# Patient Record
Sex: FEMALE | Race: WHITE | Employment: OTHER | URBAN - METROPOLITAN AREA
[De-identification: names, ages, dates, MRNs, and addresses within clinical notes are randomized per-mention and may not be internally consistent; named-entity substitution may affect disease eponyms.]

---

## 2017-06-10 ENCOUNTER — HOSPITAL ENCOUNTER (OUTPATIENT)
Dept: CT IMAGING | Age: 67
Discharge: HOME | End: 2017-06-10
Attending: FAMILY MEDICINE
Payer: COMMERCIAL

## 2017-06-10 DIAGNOSIS — K43.9 VENTRAL HERNIA WITHOUT OBSTRUCTION OR GANGRENE: ICD-10-CM

## 2017-06-10 LAB — CREAT BLD-MCNC: 0.8 MG/DL (ref 0.6–1)

## 2017-06-10 PROCEDURE — 74177 CT ABD & PELVIS W/CONTRAST: CPT

## 2017-06-10 PROCEDURE — 82565 ASSAY OF CREATININE: CPT

## 2017-06-10 PROCEDURE — 74011636320 HC RX REV CODE- 636/320: Performed by: FAMILY MEDICINE

## 2017-06-10 PROCEDURE — 74011000258 HC RX REV CODE- 258: Performed by: FAMILY MEDICINE

## 2017-06-10 RX ORDER — SODIUM CHLORIDE 0.9 % (FLUSH) 0.9 %
10 SYRINGE (ML) INJECTION
Status: COMPLETED | OUTPATIENT
Start: 2017-06-10 | End: 2017-06-10

## 2017-06-10 RX ADMIN — DIATRIZOATE MEGLUMINE AND DIATRIZOATE SODIUM 15 ML: 660; 100 LIQUID ORAL; RECTAL at 11:06

## 2017-06-10 RX ADMIN — IOPAMIDOL 100 ML: 755 INJECTION, SOLUTION INTRAVENOUS at 11:05

## 2017-06-10 RX ADMIN — SODIUM CHLORIDE 100 ML: 900 INJECTION, SOLUTION INTRAVENOUS at 11:05

## 2017-06-10 RX ADMIN — Medication 10 ML: at 11:05

## 2017-06-11 NOTE — PROGRESS NOTES
May advise that CT shows some atrophy of left abdominal muscle with some bulging, but no definite hernia. Also some scattered diverticulosis.

## 2017-07-24 ENCOUNTER — HOSPITAL ENCOUNTER (OUTPATIENT)
Dept: MAMMOGRAPHY | Age: 67
Discharge: HOME | End: 2017-07-24
Attending: FAMILY MEDICINE
Payer: COMMERCIAL

## 2017-07-24 DIAGNOSIS — Z12.31 VISIT FOR SCREENING MAMMOGRAM: ICD-10-CM

## 2017-07-24 PROCEDURE — 77067 SCR MAMMO BI INCL CAD: CPT

## 2017-10-25 PROBLEM — I49.3 PVC'S (PREMATURE VENTRICULAR CONTRACTIONS): Status: ACTIVE | Noted: 2017-10-25

## 2018-03-19 ENCOUNTER — HOSPITAL ENCOUNTER (OUTPATIENT)
Dept: MAMMOGRAPHY | Age: 68
Discharge: HOME | End: 2018-03-19
Attending: FAMILY MEDICINE
Payer: MEDICARE

## 2018-03-19 DIAGNOSIS — Z78.0 POST-MENOPAUSAL: ICD-10-CM

## 2018-03-19 PROCEDURE — 77080 DXA BONE DENSITY AXIAL: CPT

## 2018-03-19 NOTE — PROGRESS NOTES
Bone density results -   Has minimal osteopenia (mild bone loss) - improved in spine from last study in 2014 and stable in hips.   Disregard radiologist impression note, had increase, not decrease in bone density according to findings  Can repeat bone density in 3-5 years

## 2018-03-26 ENCOUNTER — HOSPITAL ENCOUNTER (OUTPATIENT)
Dept: LAB | Age: 68
Discharge: HOME | End: 2018-03-26

## 2018-03-26 PROCEDURE — 88305 TISSUE EXAM BY PATHOLOGIST: CPT | Performed by: INTERNAL MEDICINE

## 2018-07-26 ENCOUNTER — HOSPITAL ENCOUNTER (OUTPATIENT)
Dept: MAMMOGRAPHY | Age: 68
Discharge: HOME | End: 2018-07-26
Attending: FAMILY MEDICINE
Payer: MEDICARE

## 2018-07-26 DIAGNOSIS — Z12.31 VISIT FOR SCREENING MAMMOGRAM: ICD-10-CM

## 2018-07-26 PROCEDURE — 77067 SCR MAMMO BI INCL CAD: CPT

## 2018-10-25 ENCOUNTER — HOSPITAL ENCOUNTER (OUTPATIENT)
Dept: LAB | Age: 68
Discharge: HOME | End: 2018-10-25
Payer: MEDICARE

## 2018-10-25 DIAGNOSIS — R07.89 OTHER CHEST PAIN: ICD-10-CM

## 2018-10-25 LAB — TROPONIN I SERPL-MCNC: <0.02 NG/ML (ref 0.02–0.05)

## 2018-10-25 PROCEDURE — 36415 COLL VENOUS BLD VENIPUNCTURE: CPT

## 2018-10-25 PROCEDURE — 84484 ASSAY OF TROPONIN QUANT: CPT

## 2019-04-19 ENCOUNTER — HOSPITAL ENCOUNTER (OUTPATIENT)
Dept: LAB | Age: 69
Discharge: HOME | End: 2019-04-19
Payer: MEDICARE

## 2019-04-19 DIAGNOSIS — E78.00 HYPERCHOLESTEROLEMIA: ICD-10-CM

## 2019-04-19 LAB
CHOLEST SERPL-MCNC: 276 MG/DL
HDLC SERPL-MCNC: 71 MG/DL (ref 40–60)
HDLC SERPL: 3.9 {RATIO}
LDLC SERPL CALC-MCNC: 186.6 MG/DL
LIPID PROFILE,FLP: ABNORMAL
TRIGL SERPL-MCNC: 92 MG/DL (ref 35–150)
VLDLC SERPL CALC-MCNC: 18.4 MG/DL (ref 6–23)

## 2019-04-19 PROCEDURE — 36415 COLL VENOUS BLD VENIPUNCTURE: CPT

## 2019-04-19 PROCEDURE — 80061 LIPID PANEL: CPT

## 2019-05-16 ENCOUNTER — TELEPHONE (OUTPATIENT)
Dept: INTERNAL MEDICINE | Age: 69
End: 2019-05-16

## 2019-07-29 ENCOUNTER — HOSPITAL ENCOUNTER (OUTPATIENT)
Dept: MAMMOGRAPHY | Age: 69
Discharge: HOME | End: 2019-07-29
Attending: FAMILY MEDICINE
Payer: MEDICARE

## 2019-07-29 DIAGNOSIS — Z12.39 BREAST SCREENING, UNSPECIFIED: ICD-10-CM

## 2019-07-29 PROCEDURE — 77067 SCR MAMMO BI INCL CAD: CPT

## 2020-07-30 ENCOUNTER — HOSPITAL ENCOUNTER (OUTPATIENT)
Dept: MAMMOGRAPHY | Age: 70
Discharge: HOME | End: 2020-07-30
Attending: FAMILY MEDICINE
Payer: MEDICARE

## 2020-07-30 DIAGNOSIS — Z12.31 VISIT FOR SCREENING MAMMOGRAM: ICD-10-CM

## 2020-07-30 PROCEDURE — 77067 SCR MAMMO BI INCL CAD: CPT

## 2021-01-28 ENCOUNTER — TRANSCRIBE ORDER (OUTPATIENT)
Dept: SCHEDULING | Age: 71
End: 2021-01-28

## 2021-01-28 DIAGNOSIS — Z12.31 SCREENING MAMMOGRAM FOR HIGH-RISK PATIENT: Primary | ICD-10-CM

## 2021-04-14 PROBLEM — G44.029 CHRONIC CLUSTER HEADACHE, NOT INTRACTABLE: Status: ACTIVE | Noted: 2021-04-14

## 2021-07-14 PROBLEM — I65.23 ATHEROSCLEROSIS OF BOTH CAROTID ARTERIES: Status: ACTIVE | Noted: 2021-07-14

## 2021-07-24 ENCOUNTER — HOSPITAL ENCOUNTER (OUTPATIENT)
Dept: MRI IMAGING | Age: 71
Discharge: HOME | End: 2021-07-24
Attending: PSYCHIATRY & NEUROLOGY
Payer: MEDICARE

## 2021-07-24 DIAGNOSIS — G45.9 TIA (TRANSIENT ISCHEMIC ATTACK): ICD-10-CM

## 2021-07-24 PROCEDURE — A9576 INJ PROHANCE MULTIPACK: HCPCS | Performed by: PSYCHIATRY & NEUROLOGY

## 2021-07-24 PROCEDURE — 74011636320 HC RX REV CODE- 636/320: Performed by: PSYCHIATRY & NEUROLOGY

## 2021-07-24 PROCEDURE — 70553 MRI BRAIN STEM W/O & W/DYE: CPT

## 2021-07-24 RX ORDER — SODIUM CHLORIDE 0.9 % (FLUSH) 0.9 %
10 SYRINGE (ML) INJECTION
Status: ACTIVE | OUTPATIENT
Start: 2021-07-24 | End: 2021-07-24

## 2021-07-24 RX ORDER — SODIUM CHLORIDE 0.9 % (FLUSH) 0.9 %
10 SYRINGE (ML) INJECTION
Status: COMPLETED | OUTPATIENT
Start: 2021-07-24 | End: 2021-07-24

## 2021-07-24 RX ADMIN — Medication 10 ML: at 10:16

## 2021-07-24 RX ADMIN — GADOTERIDOL 16 ML: 279.3 INJECTION, SOLUTION INTRAVENOUS at 10:16

## 2021-07-30 ENCOUNTER — HOSPITAL ENCOUNTER (OUTPATIENT)
Dept: MAMMOGRAPHY | Age: 71
Discharge: HOME | End: 2021-07-30
Payer: MEDICARE

## 2021-07-30 DIAGNOSIS — Z12.31 SCREENING MAMMOGRAM FOR HIGH-RISK PATIENT: ICD-10-CM

## 2021-07-30 PROCEDURE — 77067 SCR MAMMO BI INCL CAD: CPT

## 2022-03-11 ENCOUNTER — TRANSCRIBE ORDER (OUTPATIENT)
Dept: SCHEDULING | Age: 72
End: 2022-03-11

## 2022-03-11 DIAGNOSIS — Z12.31 SCREENING MAMMOGRAM FOR HIGH-RISK PATIENT: Primary | ICD-10-CM

## 2022-03-19 PROBLEM — I65.23 ATHEROSCLEROSIS OF BOTH CAROTID ARTERIES: Status: ACTIVE | Noted: 2021-07-14

## 2022-03-20 PROBLEM — G44.029 CHRONIC CLUSTER HEADACHE, NOT INTRACTABLE: Status: ACTIVE | Noted: 2021-04-14

## 2022-03-20 PROBLEM — I49.3 PVC'S (PREMATURE VENTRICULAR CONTRACTIONS): Status: ACTIVE | Noted: 2017-10-25

## 2022-08-01 ENCOUNTER — HOSPITAL ENCOUNTER (OUTPATIENT)
Dept: MAMMOGRAPHY | Age: 72
Discharge: HOME | End: 2022-08-04
Payer: MEDICARE

## 2022-08-01 DIAGNOSIS — Z12.31 SCREENING MAMMOGRAM FOR HIGH-RISK PATIENT: ICD-10-CM

## 2022-08-01 PROCEDURE — 77067 SCR MAMMO BI INCL CAD: CPT

## 2022-10-21 ENCOUNTER — CLINICAL DOCUMENTATION (OUTPATIENT)
Dept: NEUROLOGY | Age: 72
End: 2022-10-21

## 2022-10-21 ENCOUNTER — OFFICE VISIT (OUTPATIENT)
Dept: NEUROLOGY | Age: 72
End: 2022-10-21
Payer: MEDICARE

## 2022-10-21 VITALS
BODY MASS INDEX: 33.27 KG/M2 | HEART RATE: 87 BPM | HEIGHT: 62 IN | WEIGHT: 180.8 LBS | DIASTOLIC BLOOD PRESSURE: 100 MMHG | SYSTOLIC BLOOD PRESSURE: 155 MMHG

## 2022-10-21 DIAGNOSIS — G50.0 TRIGEMINAL NEURALGIA: Primary | ICD-10-CM

## 2022-10-21 DIAGNOSIS — M54.16 LUMBAR RADICULOPATHY: ICD-10-CM

## 2022-10-21 PROCEDURE — 1123F ACP DISCUSS/DSCN MKR DOCD: CPT | Performed by: PSYCHIATRY & NEUROLOGY

## 2022-10-21 PROCEDURE — 1090F PRES/ABSN URINE INCON ASSESS: CPT | Performed by: PSYCHIATRY & NEUROLOGY

## 2022-10-21 PROCEDURE — G8417 CALC BMI ABV UP PARAM F/U: HCPCS | Performed by: PSYCHIATRY & NEUROLOGY

## 2022-10-21 PROCEDURE — G8427 DOCREV CUR MEDS BY ELIG CLIN: HCPCS | Performed by: PSYCHIATRY & NEUROLOGY

## 2022-10-21 PROCEDURE — G8399 PT W/DXA RESULTS DOCUMENT: HCPCS | Performed by: PSYCHIATRY & NEUROLOGY

## 2022-10-21 PROCEDURE — 1036F TOBACCO NON-USER: CPT | Performed by: PSYCHIATRY & NEUROLOGY

## 2022-10-21 PROCEDURE — 99214 OFFICE O/P EST MOD 30 MIN: CPT | Performed by: PSYCHIATRY & NEUROLOGY

## 2022-10-21 PROCEDURE — 3017F COLORECTAL CA SCREEN DOC REV: CPT | Performed by: PSYCHIATRY & NEUROLOGY

## 2022-10-21 PROCEDURE — G8484 FLU IMMUNIZE NO ADMIN: HCPCS | Performed by: PSYCHIATRY & NEUROLOGY

## 2022-10-21 RX ORDER — PREDNISONE 20 MG/1
TABLET ORAL
Qty: 12 TABLET | Refills: 0 | Status: SHIPPED | OUTPATIENT
Start: 2022-10-21

## 2022-10-21 RX ORDER — CYCLOBENZAPRINE HCL 10 MG
10 TABLET ORAL 3 TIMES DAILY PRN
Qty: 90 TABLET | Refills: 5 | Status: SHIPPED | OUTPATIENT
Start: 2022-10-21

## 2022-10-21 ASSESSMENT — ENCOUNTER SYMPTOMS
ALLERGIC/IMMUNOLOGIC NEGATIVE: 1
BACK PAIN: 1
EYES NEGATIVE: 1
GASTROINTESTINAL NEGATIVE: 1
RESPIRATORY NEGATIVE: 1

## 2022-10-21 NOTE — PROGRESS NOTES
[address], [address], [address]  Phone: [phone] Fax [phone]  Dr. Matthias Mckinley      10/21/2022  Mauricio Gab     Patient is referred by the following provider for consultation regarding as below:       I reviewed the available records and notes and have examined patient with the following findings:     Chief Complaint:  Chief Complaint   Patient presents with    Headache          HPI: This is a right handed 71 y.o.  female who is very pleasant very appropriate patient unfortunately does suffer from left facial trigger trigeminal neuralgia. This started in 2009 after she had shingles in the V1 V2 and V3 distribution with residual postherpetic neuralgia in this distribution. She uses like Aleve and Flexeril. Which helps within 30 minutes and she wants to avoid all narcotics or other medicines. Especially daily antiepileptic medications. She also suffers from cluster migraine headaches were once a week she can get a headache they can last a week or can last a month. It is difficult to say but she is not had this problem in quite some time she tried gabapentin Celexa and Lyrica for bad side effects so she does not want any medicines for at this time. She is got a history of having recurrent episodes of vision loss of lasting 20 seconds. She is had about 7 or 8 episodes. She is not had any further episodes on last evaluation in this evaluation we did suggest CTAs of her neck to look at the vertebral arteries which she wants to avoid. She did develop over the summer this bugs crawling over her right calf that was very bothersome to her. But then it developed into lower lumbar back pain with radicular symptoms down her right leg. To the point where she is now weak in the leg and limps with the right leg. She wants to try acupuncture. She wants to try physical therapy as well. But she is noticing weakness of that leg.     IMAGING REVIEW:  I REVIEWED PERTINENT  IMAGES AND REPORTS WITH THE PATIENT PERSONALLY, DIRECTLY AND FULLY. Past Medical History:  Past Medical History:   Diagnosis Date    Breast cancer ([facility] 2002    right, s/p mastectomy    [facility]     Chest pain     Coronary atherosclerosis of native coronary vessel     Dyslipidemia     Endometriosis     Family history of cardiovascular disease     GERD (gastroesophageal reflux disease)     H/O cardiovascular stress test 4-6-15    normal cardiolilte    H/O exercise stress test 6-5-13    Negative    Hodgkin lymphoma ([facility] 1982    s/p chemo    Hypercholesterolemia     Rosuvastatin - side effects. Hypothyroidism     Osteopenia     took Alendronate for 7 years; stopped in        Past Surgical History:  Past Surgical History:   Procedure Laterality Date    APPENDECTOMY  1968    BREAST BIOPSY Right 2002    BREAST RECONSTRUCTION Right 2002    CHOLECYSTECTOMY  2003    COLONOSCOPY  03/26/2018    tubular adenoma    COLONOSCOPY  1-9-15    diverticulosis, internal hemorrhoids, colon polyp (tubular adenoma)    LYMPH NODE DISSECTION  1982    left neck    MASTECTOMY Right 2002    UPPER GASTROINTESTINAL ENDOSCOPY  1-9-15    GERD       Social History:  Social History     Socioeconomic History    Marital status:      Spouse name: Not on file    Number of children: Not on file    Years of education: Not on file    Highest education level: Not on file   Occupational History    Not on file   Tobacco Use    Smoking status: Never    Smokeless tobacco: Never   Substance and Sexual Activity    Alcohol use:  Yes     Alcohol/week: 0.0 standard drinks    Drug use: Not on file    Sexual activity: Not on file   Other Topics Concern    Not on file   Social History Narrative    Not on file     Social Determinants of Health     Financial Resource Strain: Not on file   Food Insecurity: Not on file   Transportation Needs: Not on file   Physical Activity: Not on file   Stress: Not on file   Social Connections: Not on file   Intimate Partner Violence: Not on file   Housing Stability: Not on file       Family History:   Family History   Problem Relation Age of Onset    Heart Disease Brother 44        cardiac arrest    Breast Cancer Neg Hx     Heart Disease Sister 36        MI    Diabetes Sister     Heart Disease Father 58        CABG    Cancer Father         lung    Osteoporosis Mother     Diabetes Mother     Heart Disease Mother 79        MI       Current Outpatient Medications on File Prior to Visit   Medication Sig Dispense Refill    albuterol sulfate  (90 Base) MCG/ACT inhaler Inhale 1-2 puffs into the lungs every 4 hours as needed      atorvastatin (LIPITOR) 20 MG tablet Take by mouth daily      levothyroxine (SYNTHROID) 100 MCG tablet Take 100 mcg by mouth every morning (before breakfast)      omeprazole (PRILOSEC) 40 MG delayed release capsule TAKE 1 CAPSULE BY MOUTH ONCE DAILY EVERY DAY 30 MINUTES PRIOR TO BREAKFAST       No current facility-administered medications on file prior to visit. Allergies   Allergen Reactions    Rosuvastatin Other (See Comments)     Caused reaction with thyroid    Lidocaine Hcl Other (See Comments)     Sloughs skin    Cefazolin Other (See Comments) and Rash    Mupirocin Rash and Swelling     OINTMENT    Penicillins Rash    Quinolones Rash    Sulfa Antibiotics Rash       Review of Systems:  Review of Systems   Constitutional: Negative. HENT: Negative. Eyes: Negative. Respiratory: Negative. Cardiovascular: Negative. Gastrointestinal: Negative. Endocrine: Negative. Genitourinary: Negative. Musculoskeletal:  Positive for back pain. Skin: Negative. Allergic/Immunologic: Negative. Neurological:         Face pain    No flowsheet data found. No flowsheet data found.        Vitals:    10/21/22 0812   BP: (!) 155/100   Pulse: 87   Weight: 180 lb 12.8 oz (82 kg)   Height: 5' 2\" (1.575 m)        Physical Exam  Constitutional: Appearance: Normal appearance. HENT:      Head: Normocephalic and atraumatic. Eyes:      Extraocular Movements: Extraocular movements intact and EOM normal.      Pupils: Pupils are equal, round, and reactive to light. Cardiovascular:      Rate and Rhythm: Normal rate. Pulses: Normal pulses. Pulmonary:      Effort: Pulmonary effort is normal.   Abdominal:      General: Abdomen is flat. Neurological:      Mental Status: She is alert and oriented to person, place, and time. Deep Tendon Reflexes:      Reflex Scores:       Tricep reflexes are 1+ on the right side and 1+ on the left side. Bicep reflexes are 1+ on the right side and 1+ on the left side. Brachioradialis reflexes are 1+ on the right side and 1+ on the left side. Patellar reflexes are 0 on the right side and 1+ on the left side. Achilles reflexes are 1+ on the right side and 1+ on the left side. Neurologic Exam     Mental Status   Oriented to person, place, and time. Attention: normal. Concentration: normal.   Level of consciousness: alert  Knowledge: good. Cranial Nerves     CN II   Visual fields full to confrontation. CN III, IV, VI   Pupils are equal, round, and reactive to light. Extraocular motions are normal.     CN VII   Facial expression full, symmetric. Motor Exam   Right arm tone: normal  Left arm tone: normal  Right leg tone: decreased  Left leg tone: normalShe has circumduction of her right leg decreased range of motion lumbar spine flexion extension side bending.      Gait, Coordination, and Reflexes     Gait  Gait: circumduction and wide-based    Tremor   Resting tremor: absent  Intention tremor: absent  Action tremor: absent    Reflexes   Right brachioradialis: 1+  Left brachioradialis: 1+  Right biceps: 1+  Left biceps: 1+  Right triceps: 1+  Left triceps: 1+  Right patellar: 0  Left patellar: 1+  Right achilles: 1+  Left achilles: 1+        Assessment   Assessment / Plan: Linda Blanco was seen today for headache. Diagnoses and all orders for this visit:    Trigeminal neuralgia the patient does suffer from left Heidrick trigeminal neuralgia she does not want any aggressive treatment other than Flexeril and Aleve which were in a refill she has had a few bouts of it over the summer. Lumbar radiculopathy the patient states that about several months ago she started feeling bugs over her right calf that progressively became back pain shooting down the right leg which does sound as if this is a lumbar radiculopathy. She is noticing weakness Evett she is also or we noticed a significant decrease in her right patella reflex. Organ to move forward with an MRI of the lumbar spine physical therapy oral steroids and Flexeril.  -     MRI LUMBAR SPINE WO CONTRAST; Future    Other orders  -     cyclobenzaprine (FLEXERIL) 10 MG tablet; Take 1 tablet by mouth 3 times daily as needed for Muscle spasms  -     predniSONE (DELTASONE) 20 MG tablet; Take 3 po q day for 3 days than 2 po q day for 2 days than 1 po q day for 2 days than stop        The Diagnosis and differential diagnostic considerations, and Rx Tx were reviewed with the patient at length. Orders Placed This Encounter   Procedures    MRI LUMBAR SPINE WO CONTRAST     Standing Status:   Future     Standing Expiration Date:   10/21/2023     Scheduling Instructions:      GILLIAN     Order Specific Question:   Reason for exam:     Answer:   rt          I have spent greater than 50% of visit discussing and counseling of patient  for treatment and diagnostic plan review. Total time 30 min     . Notes: Patient is to continue all medications as directed by prescribing physicians. Continuations on today's visit are made based on the patient's report of current medications.              Dr. Lela Oconnor  Consultation Neurology, Neurodiagnostics and Neurotherapeutics  Neuroelectrophysiology, EEG, EMG  [facility]  [id number] [facility], [address] [place], [place] [address] [facility]  Phone:  [phone]  Fax:   [phone]

## 2022-10-21 NOTE — PROGRESS NOTES
MRI order faxed to [facility] for scheduling at [facility]. Patient is aware that Jenni will contact them for appointment. Information sent to [facility] for possible authorization.

## 2022-11-02 ENCOUNTER — OFFICE VISIT (OUTPATIENT)
Dept: NEUROLOGY | Age: 72
End: 2022-11-02
Payer: MEDICARE

## 2022-11-02 DIAGNOSIS — G89.29 OTHER CHRONIC PAIN: ICD-10-CM

## 2022-11-02 DIAGNOSIS — G62.9 NEUROPATHY: ICD-10-CM

## 2022-11-02 DIAGNOSIS — R20.0 NUMBNESS AND TINGLING OF BOTH FEET: ICD-10-CM

## 2022-11-02 DIAGNOSIS — R20.2 NUMBNESS AND TINGLING OF BOTH FEET: ICD-10-CM

## 2022-11-02 DIAGNOSIS — M54.16 LUMBAR RADICULOPATHY: Primary | ICD-10-CM

## 2022-11-02 PROCEDURE — 95885 MUSC TST DONE W/NERV TST LIM: CPT | Performed by: PSYCHIATRY & NEUROLOGY

## 2022-11-02 PROCEDURE — 95911 NRV CNDJ TEST 9-10 STUDIES: CPT | Performed by: PSYCHIATRY & NEUROLOGY

## 2022-11-02 NOTE — PROGRESS NOTES
[address], [address] [facility]       Nerve Conduction Study and Electromyogram Report           Hx: 71 y.o. female referred for emg/ncv BLE by Shira Parsons D.O. Leg weakness with numbness and tingiling. Pt has hx of [address]? [place] WITH CHEMO/BREAST CA TREATED WITH ORAL CHEMO AND SINGLE MASTECTOMY. Hx left foot fracture. Clinical summary was reviewed. Neurological examination: Motor power showed adequate strength bilateral lower limb. There was mild distal atrophy. There were no fasciculations. Light touch legs was present. Gait slow. Description: Nerve conduction studies were performed on the right lower extremity and left lower extremity, using standard technique. Bilateral sural nerve showed normal response. Bilateral plantar medial response was present; lateral absent. Bilateral peroneal motor was normal. Right tibial motor was normal; left distal latency and CV were normal, amplitude was reduced 2.7 mV. Needle electromyography was performed on the right lower extremity and left lower extremity, using standard concentric electrodes. No denervation or renervation changes, mildly reduced recruitment was recorded from bilateral gastrocnemius likely effort related. 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 Needle EMG to bilateral lower limb was normal showing no evidence of femoral/sciatic neuropathy, L3/4/5/S1 radiculopathy or plexopathy. No myopathic process.            Procedure Details: Under procedure category          Remedios Rodriguez MD

## 2022-11-03 ENCOUNTER — HOSPITAL ENCOUNTER (OUTPATIENT)
Dept: MRI IMAGING | Age: 72
Discharge: HOME | End: 2022-11-06
Payer: MEDICARE

## 2022-11-03 DIAGNOSIS — M54.16 LUMBAR RADICULOPATHY: ICD-10-CM

## 2022-11-03 PROCEDURE — 72148 MRI LUMBAR SPINE W/O DYE: CPT

## 2023-05-10 ENCOUNTER — OFFICE VISIT (OUTPATIENT)
Dept: NEUROLOGY | Age: 73
End: 2023-05-10
Payer: MEDICARE

## 2023-05-10 DIAGNOSIS — G50.0 TRIGEMINAL NEURALGIA: Primary | ICD-10-CM

## 2023-05-10 DIAGNOSIS — M51.36 DDD (DEGENERATIVE DISC DISEASE), LUMBAR: ICD-10-CM

## 2023-05-10 DIAGNOSIS — R20.2 PARESTHESIA: ICD-10-CM

## 2023-05-10 DIAGNOSIS — G43.709 CHRONIC MIGRAINE WITHOUT AURA WITHOUT STATUS MIGRAINOSUS, NOT INTRACTABLE: ICD-10-CM

## 2023-05-10 PROCEDURE — G8427 DOCREV CUR MEDS BY ELIG CLIN: HCPCS | Performed by: PSYCHIATRY & NEUROLOGY

## 2023-05-10 PROCEDURE — 1036F TOBACCO NON-USER: CPT | Performed by: PSYCHIATRY & NEUROLOGY

## 2023-05-10 PROCEDURE — G8417 CALC BMI ABV UP PARAM F/U: HCPCS | Performed by: PSYCHIATRY & NEUROLOGY

## 2023-05-10 PROCEDURE — G8399 PT W/DXA RESULTS DOCUMENT: HCPCS | Performed by: PSYCHIATRY & NEUROLOGY

## 2023-05-10 PROCEDURE — 1123F ACP DISCUSS/DSCN MKR DOCD: CPT | Performed by: PSYCHIATRY & NEUROLOGY

## 2023-05-10 PROCEDURE — 3017F COLORECTAL CA SCREEN DOC REV: CPT | Performed by: PSYCHIATRY & NEUROLOGY

## 2023-05-10 PROCEDURE — 99214 OFFICE O/P EST MOD 30 MIN: CPT | Performed by: PSYCHIATRY & NEUROLOGY

## 2023-05-10 PROCEDURE — 1090F PRES/ABSN URINE INCON ASSESS: CPT | Performed by: PSYCHIATRY & NEUROLOGY

## 2023-05-10 RX ORDER — PREDNISONE 20 MG/1
TABLET ORAL
Qty: 12 TABLET | Refills: 0 | Status: SHIPPED | OUTPATIENT
Start: 2023-05-10

## 2023-05-10 RX ORDER — CYCLOBENZAPRINE HCL 10 MG
10 TABLET ORAL 2 TIMES DAILY PRN
Qty: 60 TABLET | Refills: 5 | Status: SHIPPED | OUTPATIENT
Start: 2023-05-10

## 2023-05-10 ASSESSMENT — ENCOUNTER SYMPTOMS
ALLERGIC/IMMUNOLOGIC NEGATIVE: 1
BACK PAIN: 1
GASTROINTESTINAL NEGATIVE: 1
EYES NEGATIVE: 1
RESPIRATORY NEGATIVE: 1

## 2023-05-10 NOTE — PROGRESS NOTES
and Rhythm: Normal rate and regular rhythm. Pulses: Normal pulses. Pulmonary:      Effort: Pulmonary effort is normal.   Abdominal:      General: Abdomen is flat. Neurological:      Mental Status: She is alert and oriented to person, place, and time. Coordination: Finger-Nose-Finger Test, Heel to [employer] and Romberg Test normal.      Deep Tendon Reflexes:      Reflex Scores:       Tricep reflexes are 1+ on the right side and 1+ on the left side. Bicep reflexes are 1+ on the right side and 1+ on the left side. Brachioradialis reflexes are 1+ on the right side and 1+ on the left side. Patellar reflexes are 1+ on the right side and 1+ on the left side. Achilles reflexes are 1+ on the right side and 1+ on the left side. Neurologic Exam     Mental Status   Oriented to person, place, and time. Attention: normal. Concentration: normal.   Level of consciousness: alert  Knowledge: good. Cranial Nerves     CN II   Visual fields full to confrontation. CN III, IV, VI   Pupils are equal, round, and reactive to light. Extraocular motions are normal.     CN VII   Facial expression full, symmetric.      Motor Exam   Right arm tone: normal  Left arm tone: normal  Right leg tone: normal  Left leg tone: normal    Gait, Coordination, and Reflexes     Gait  Gait: wide-based    Coordination   Romberg: negative  Finger to nose coordination: normal  Heel to shin coordination: normal    Tremor   Resting tremor: absent  Intention tremor: absent  Action tremor: absent    Reflexes   Right brachioradialis: 1+  Left brachioradialis: 1+  Right biceps: 1+  Left biceps: 1+  Right triceps: 1+  Left triceps: 1+  Right patellar: 1+  Left patellar: 1+  Right achilles: 1+  Left achilles: 1+        Assessment   Assessment / Plan:    Diagnoses and all orders for this visit:    Trigeminal neuralgia she is very stable using over-the-counter medicines like Aleve along with Flexeril completely eliminates
stated

## 2023-08-04 ENCOUNTER — HOSPITAL ENCOUNTER (OUTPATIENT)
Dept: MAMMOGRAPHY | Age: 73
End: 2023-08-04
Payer: MEDICARE

## 2023-08-04 VITALS — BODY MASS INDEX: 31.64 KG/M2 | WEIGHT: 173 LBS

## 2023-08-04 DIAGNOSIS — Z12.31 ENCOUNTER FOR SCREENING MAMMOGRAM FOR BREAST CANCER: ICD-10-CM

## 2023-08-04 PROCEDURE — 77067 SCR MAMMO BI INCL CAD: CPT

## 2024-03-11 ENCOUNTER — OFFICE VISIT (OUTPATIENT)
Dept: NEUROLOGY | Age: 74
End: 2024-03-11
Payer: MEDICARE

## 2024-03-11 VITALS
SYSTOLIC BLOOD PRESSURE: 113 MMHG | DIASTOLIC BLOOD PRESSURE: 75 MMHG | BODY MASS INDEX: 31.8 KG/M2 | HEIGHT: 62 IN | HEART RATE: 82 BPM | WEIGHT: 172.8 LBS | OXYGEN SATURATION: 98 %

## 2024-03-11 DIAGNOSIS — G43.709 CHRONIC MIGRAINE WITHOUT AURA WITHOUT STATUS MIGRAINOSUS, NOT INTRACTABLE: ICD-10-CM

## 2024-03-11 DIAGNOSIS — G45.9 TIA (TRANSIENT ISCHEMIC ATTACK): ICD-10-CM

## 2024-03-11 DIAGNOSIS — M54.16 LUMBAR RADICULITIS: ICD-10-CM

## 2024-03-11 DIAGNOSIS — G50.0 TRIGEMINAL NEURALGIA: Primary | ICD-10-CM

## 2024-03-11 PROCEDURE — 99214 OFFICE O/P EST MOD 30 MIN: CPT | Performed by: PSYCHIATRY & NEUROLOGY

## 2024-03-11 PROCEDURE — 1090F PRES/ABSN URINE INCON ASSESS: CPT | Performed by: PSYCHIATRY & NEUROLOGY

## 2024-03-11 PROCEDURE — 1036F TOBACCO NON-USER: CPT | Performed by: PSYCHIATRY & NEUROLOGY

## 2024-03-11 PROCEDURE — G8484 FLU IMMUNIZE NO ADMIN: HCPCS | Performed by: PSYCHIATRY & NEUROLOGY

## 2024-03-11 PROCEDURE — 1123F ACP DISCUSS/DSCN MKR DOCD: CPT | Performed by: PSYCHIATRY & NEUROLOGY

## 2024-03-11 PROCEDURE — G8417 CALC BMI ABV UP PARAM F/U: HCPCS | Performed by: PSYCHIATRY & NEUROLOGY

## 2024-03-11 PROCEDURE — G8427 DOCREV CUR MEDS BY ELIG CLIN: HCPCS | Performed by: PSYCHIATRY & NEUROLOGY

## 2024-03-11 PROCEDURE — G8399 PT W/DXA RESULTS DOCUMENT: HCPCS | Performed by: PSYCHIATRY & NEUROLOGY

## 2024-03-11 PROCEDURE — 3017F COLORECTAL CA SCREEN DOC REV: CPT | Performed by: PSYCHIATRY & NEUROLOGY

## 2024-03-11 RX ORDER — CYCLOBENZAPRINE HCL 10 MG
10 TABLET ORAL 2 TIMES DAILY PRN
Qty: 60 TABLET | Refills: 5 | Status: SHIPPED | OUTPATIENT
Start: 2024-03-11

## 2024-03-11 RX ORDER — METOPROLOL SUCCINATE 25 MG/1
25 TABLET, EXTENDED RELEASE ORAL DAILY
COMMUNITY
Start: 2024-02-15 | End: 2025-02-14

## 2024-03-11 RX ORDER — ASPIRIN 81 MG/1
81 TABLET ORAL DAILY
COMMUNITY
Start: 2024-02-15 | End: 2025-02-14

## 2024-03-11 ASSESSMENT — ENCOUNTER SYMPTOMS
EYES NEGATIVE: 1
GASTROINTESTINAL NEGATIVE: 1
RESPIRATORY NEGATIVE: 1

## 2024-03-11 NOTE — PROGRESS NOTES
[facility]  [address], [address]  [place] [address]  Phone: [phone] Fax [phone]  Dr. Quique Garcia      3/11/2024  Erica Cleveland     Patient is referred by the following provider for consultation regarding as below:       I reviewed the available records and notes and have examined patient with the following findings:     Chief Complaint:  Chief Complaint   Patient presents with    Follow-up          HPI: This is a right handed 73 y.o.  female who is very pleasant very appropriate patient unfortunately does suffer from left V1 V2 V3 distribution trigeminal neuralgia secondary to postherpetic neuralgia.  She uses Flexeril 10 mg and Aleve what ever there is a flareup.  She does not care for the antiepileptic medications or narcotics which she has tried in the past.  Next the medicine she has tried in the past included gabapentin Lyrica and many others    The patient also gets these cluster of headaches that occur they occur once a week they can last 1 month go away for a week they are just very sporadic she is not on any preventative medicine is stable with her headaches.    She has a history of lumbar radiculitis.  She had bilateral lumbar and leg symptoms.  MRI of the lumbar spine showed L5-S1 moderate bilateral neuroforaminal stenosis and L4-5 broad-based disc bulge.  L3-4 there is also moderate neuroforaminal stenosis.  Steroids took her back pain completely away left her with some paresthesias of her calf which are now resolved.  Her last MRI of the brain was done in 2021 which was normal.  She has a history of breast cancer GERD endometriosis Hodgkin's lymphoma as well.  She has not had an MRI in several years and I think it needs to be readdressed.    Feels that she has seasonal episodes she has not had 1 in a year what happens that she for a brief moment 1 or 2 seconds maximum she cannot communicate but she can hear she just cannot talk.  Last 1 or 2 seconds and

## 2024-03-27 ENCOUNTER — HOSPITAL ENCOUNTER (OUTPATIENT)
Dept: CT IMAGING | Age: 74
Discharge: HOME | End: 2024-03-30
Attending: PSYCHIATRY & NEUROLOGY
Payer: MEDICARE

## 2024-03-27 ENCOUNTER — HOSPITAL ENCOUNTER (OUTPATIENT)
Dept: MRI IMAGING | Age: 74
Discharge: HOME | End: 2024-03-30
Attending: PSYCHIATRY & NEUROLOGY
Payer: MEDICARE

## 2024-03-27 DIAGNOSIS — G45.9 TIA (TRANSIENT ISCHEMIC ATTACK): ICD-10-CM

## 2024-03-27 LAB — CREAT BLD-MCNC: 0.48 MG/DL (ref 0.8–1.5)

## 2024-03-27 PROCEDURE — 70496 CT ANGIOGRAPHY HEAD: CPT | Performed by: RADIOLOGY

## 2024-03-27 PROCEDURE — 6360000004 HC RX CONTRAST MEDICATION: Performed by: PSYCHIATRY & NEUROLOGY

## 2024-03-27 PROCEDURE — 70496 CT ANGIOGRAPHY HEAD: CPT

## 2024-03-27 PROCEDURE — A9579 GAD-BASE MR CONTRAST NOS,1ML: HCPCS | Performed by: PSYCHIATRY & NEUROLOGY

## 2024-03-27 PROCEDURE — 70498 CT ANGIOGRAPHY NECK: CPT | Performed by: RADIOLOGY

## 2024-03-27 PROCEDURE — 70553 MRI BRAIN STEM W/O & W/DYE: CPT

## 2024-03-27 PROCEDURE — 82565 ASSAY OF CREATININE: CPT

## 2024-03-27 RX ADMIN — GADOTERIDOL 17 ML: 279.3 INJECTION, SOLUTION INTRAVENOUS at 15:20

## 2024-03-27 RX ADMIN — IOPAMIDOL 100 ML: 755 INJECTION, SOLUTION INTRAVENOUS at 14:40

## 2024-03-28 ENCOUNTER — CLINICAL DOCUMENTATION (OUTPATIENT)
Dept: NEUROLOGY | Age: 74
End: 2024-03-28

## 2024-03-28 NOTE — PROGRESS NOTES
I spoke with the patient this lesion seen on the CTA and her lung is well-known and well-documented well watched by [facility] she gets PET scans on a regular basis and they have been watching this.  It has been fairly benign and unchanging for quite some time.

## 2024-08-05 ENCOUNTER — HOSPITAL ENCOUNTER (OUTPATIENT)
Dept: MAMMOGRAPHY | Age: 74
Discharge: HOME | End: 2024-08-08
Payer: MEDICARE

## 2024-08-05 DIAGNOSIS — Z12.31 ENCOUNTER FOR MAMMOGRAM TO ESTABLISH BASELINE MAMMOGRAM: ICD-10-CM

## 2024-08-05 PROCEDURE — 77063 BREAST TOMOSYNTHESIS BI: CPT

## 2025-02-18 ENCOUNTER — TRANSCRIBE ORDERS (OUTPATIENT)
Dept: SCHEDULING | Age: 75
End: 2025-02-18

## 2025-02-18 DIAGNOSIS — Z12.31 VISIT FOR SCREENING MAMMOGRAM: Primary | ICD-10-CM

## 2025-03-11 ENCOUNTER — OFFICE VISIT (OUTPATIENT)
Dept: NEUROLOGY | Age: 75
End: 2025-03-11
Payer: MEDICARE

## 2025-03-11 VITALS
HEIGHT: 62 IN | SYSTOLIC BLOOD PRESSURE: 129 MMHG | DIASTOLIC BLOOD PRESSURE: 79 MMHG | HEART RATE: 72 BPM | BODY MASS INDEX: 31.09 KG/M2

## 2025-03-11 DIAGNOSIS — S09.90XD INJURY OF HEAD, SUBSEQUENT ENCOUNTER: ICD-10-CM

## 2025-03-11 DIAGNOSIS — M54.16 LUMBAR RADICULITIS: ICD-10-CM

## 2025-03-11 DIAGNOSIS — G50.0 TRIGEMINAL NEURALGIA: ICD-10-CM

## 2025-03-11 DIAGNOSIS — G43.709 CHRONIC MIGRAINE WITHOUT AURA WITHOUT STATUS MIGRAINOSUS, NOT INTRACTABLE: Primary | ICD-10-CM

## 2025-03-11 PROCEDURE — 1159F MED LIST DOCD IN RCRD: CPT | Performed by: PSYCHIATRY & NEUROLOGY

## 2025-03-11 PROCEDURE — 1123F ACP DISCUSS/DSCN MKR DOCD: CPT | Performed by: PSYCHIATRY & NEUROLOGY

## 2025-03-11 PROCEDURE — 99214 OFFICE O/P EST MOD 30 MIN: CPT | Performed by: PSYCHIATRY & NEUROLOGY

## 2025-03-11 PROCEDURE — G8417 CALC BMI ABV UP PARAM F/U: HCPCS | Performed by: PSYCHIATRY & NEUROLOGY

## 2025-03-11 PROCEDURE — G8399 PT W/DXA RESULTS DOCUMENT: HCPCS | Performed by: PSYCHIATRY & NEUROLOGY

## 2025-03-11 PROCEDURE — 1090F PRES/ABSN URINE INCON ASSESS: CPT | Performed by: PSYCHIATRY & NEUROLOGY

## 2025-03-11 PROCEDURE — 1160F RVW MEDS BY RX/DR IN RCRD: CPT | Performed by: PSYCHIATRY & NEUROLOGY

## 2025-03-11 PROCEDURE — 3017F COLORECTAL CA SCREEN DOC REV: CPT | Performed by: PSYCHIATRY & NEUROLOGY

## 2025-03-11 PROCEDURE — G8427 DOCREV CUR MEDS BY ELIG CLIN: HCPCS | Performed by: PSYCHIATRY & NEUROLOGY

## 2025-03-11 PROCEDURE — 1036F TOBACCO NON-USER: CPT | Performed by: PSYCHIATRY & NEUROLOGY

## 2025-03-11 RX ORDER — CYCLOBENZAPRINE HCL 10 MG
10 TABLET ORAL 2 TIMES DAILY PRN
Qty: 60 TABLET | Refills: 5 | Status: SHIPPED | OUTPATIENT
Start: 2025-03-11

## 2025-03-11 ASSESSMENT — ENCOUNTER SYMPTOMS
GASTROINTESTINAL NEGATIVE: 1
RESPIRATORY NEGATIVE: 1
ALLERGIC/IMMUNOLOGIC NEGATIVE: 1
EYES NEGATIVE: 1

## 2025-03-11 ASSESSMENT — VISUAL ACUITY: VA_NORMAL: 1

## 2025-03-11 NOTE — PROGRESS NOTES
[facility]  [address], [address]  [place] [address]  Phone: [phone] Fax [phone]  Dr. Quique Garcia      3/11/2025  Erica Cleveland     Patient is referred by the following provider for consultation regarding as below:       I reviewed the available records and notes and have examined patient with the following findings:     Chief Complaint:  Chief Complaint   Patient presents with    Follow-up     Trigeminal neuralgia           HPI: This is a right handed 74 y.o.  female here with her .  The patient unfortunately January 2025 the patient was cleaning up underneath the countertop she lifted her head up and smashed her left head in the parietal region.  She kind of blacked out but did not fully lose consciousness.  Kind of came around she had severe left neck pain severe trigeminal pain flared up she kept ice on her head for a month.  Which did help her.  She discontinued her aspirin just in this timeframe because she was scared of bleeding the brain but she did not go to the emergency room.  She has severe increase in her headaches that improved.  She is back to her baseline.  She is back on aspirin 81 mg daily.  It did aggravate her trigeminal nerve neuralgia in the V1 V2 V3 distribution so she was using her Flexeril which helps her.    This patient does have left V1 to 3 trigeminal neuralgia secondary to postherpetic neuralgia.  Flexeril 10 mg a day definitely helps her when she needs it.    She has a history of lumbar radiculitis with multilevel degenerative disc disease but nonsurgical.  It can occur in her bilateral back and down her bilateral legs and it is stable at this time.    Seasonally she gets 3 times a year this brief episode last 1 to 2 seconds where she cannot communicate but understands everything going around her very stable.  We did an MRI of her brain which was normal and a CTA of her head and neck which was normal.  Did show a lung lesion that was

## 2025-08-07 ENCOUNTER — HOSPITAL ENCOUNTER (OUTPATIENT)
Dept: MAMMOGRAPHY | Age: 75
Discharge: HOME | End: 2025-08-10
Attending: CLINIC/CENTER
Payer: MEDICARE

## 2025-08-07 DIAGNOSIS — Z12.31 VISIT FOR SCREENING MAMMOGRAM: ICD-10-CM

## 2025-08-07 PROCEDURE — 77063 BREAST TOMOSYNTHESIS BI: CPT
